# Patient Record
Sex: MALE | Race: WHITE | Employment: FULL TIME | URBAN - METROPOLITAN AREA
[De-identification: names, ages, dates, MRNs, and addresses within clinical notes are randomized per-mention and may not be internally consistent; named-entity substitution may affect disease eponyms.]

---

## 2024-11-12 ENCOUNTER — HOSPITAL ENCOUNTER (EMERGENCY)
Age: 24
Discharge: HOME | End: 2024-11-12
Attending: EMERGENCY MEDICINE
Payer: COMMERCIAL

## 2024-11-12 VITALS
HEIGHT: 72 IN | TEMPERATURE: 97.9 F | OXYGEN SATURATION: 100 % | RESPIRATION RATE: 18 BRPM | BODY MASS INDEX: 36.91 KG/M2 | SYSTOLIC BLOOD PRESSURE: 135 MMHG | WEIGHT: 272.49 LBS | HEART RATE: 98 BPM | DIASTOLIC BLOOD PRESSURE: 94 MMHG

## 2024-11-12 DIAGNOSIS — J01.90 ACUTE SINUSITIS, RECURRENCE NOT SPECIFIED, UNSPECIFIED LOCATION: Primary | ICD-10-CM

## 2024-11-12 PROCEDURE — 99283 EMERGENCY DEPT VISIT LOW MDM: CPT

## 2024-11-12 ASSESSMENT — PAIN DESCRIPTION - ORIENTATION: ORIENTATION: OTHER (COMMENT)

## 2024-11-12 ASSESSMENT — PAIN SCALES - GENERAL: PAINLEVEL_OUTOF10: 7

## 2024-11-12 ASSESSMENT — LIFESTYLE VARIABLES
HOW MANY STANDARD DRINKS CONTAINING ALCOHOL DO YOU HAVE ON A TYPICAL DAY: 1 OR 2
HOW OFTEN DO YOU HAVE A DRINK CONTAINING ALCOHOL: 2-4 TIMES A MONTH

## 2024-11-12 ASSESSMENT — PAIN DESCRIPTION - DESCRIPTORS: DESCRIPTORS: ACHING

## 2024-11-12 ASSESSMENT — PAIN - FUNCTIONAL ASSESSMENT: PAIN_FUNCTIONAL_ASSESSMENT: 0-10

## 2024-11-12 NOTE — ED PROVIDER NOTES
[facility]  EMERGENCY DEPARTMENT ENCOUNTER          ATTENDING PHYSICIAN NOTE       Date of evaluation: 11/12/2024    Chief Complaint     Hypertension and Headache (Patient presents to ED with report of elevated blood pressure and sinus headache that has been ongoing. Reports going to an urgent care today due to a sinus headache, reports he was told his blood pressure was high at 160/110 and referred here.)      History of Present Illness     Lloyd Rico is a 24 y.o. male who presents with complaints of a headache behind his eyes present for the last 10 days.  The patient feels like it is a sinus headache as he has had these in the past.  He went to an urgent care and they found his blood pressure to be in the 160s and they said they would not treat him and that he would need to go to the hospital.  He feels well otherwise.  He has some mild URI symptoms but denies any bloody nasal discharge.  He feels pressure in his face.    Review of Systems     No fevers or chills.  No nausea or vomiting.  See HPI for further details. Review of systems otherwise negative.     Past Medical, Surgical, Family, and Social History     He has no past medical history on file.  He has no past surgical history on file.  His family history is not on file.  He     Medications     Discharge Medication List as of 11/12/2024  3:53 PM          Allergies     He has No Known Allergies.    Physical Exam     INITIAL VITALS: BP: (!) 135/94, Temp: 97.9 °F (36.6 °C), Pulse: 98, Respirations: 18, SpO2: 100 %   Constitutional:  Well developed, well nourished, no acute distress, non-toxic appearance   Eyes: PERRL, conjunctiva normal   HENT:  There is some congestion of the nasal mucosa  Respiratory: Normal respiratory effort  Integument:  Well hydrated, no rash     Diagnostic Results     RADIOLOGY:  No orders to display       LABS:   No results found for this visit on 11/12/24.      ED Course     Nursing Notes, Past Medical Hx, Past